# Patient Record
(demographics unavailable — no encounter records)

---

## 2017-06-20 NOTE — ERA
ER Documentation


Chief Complaint


Date/Time


DATE: 6/20/17 


TIME: 19:53


Chief Complaint


BIB  C/O LOWER BACK PAIN X 2 WEEKS.DENIES ANY TRAUMA





HPI


This is a 46-year-old female presenting with a chief complaint of chest pain 

for the past few months that worsens at night, and mid back pain 2 weeks.  

Patient has a history of hypertension and TIA 1 year ago.  Has a chronic 

history of shortness of breath.  Denies smoking, recreational drug use or 

alcohol.


Patient says that the mid back pain is worse with movement.  Patient's back 

pain has been for the past 2 months.  Patient denies pain lasting more than 6 

weeks; Denies saddle parasthesia, incontinence, RPND, or pain exacerbated by 

valsalva; Denies fever, chills, night sweats, weight loss, or increase symptoms 

at night. Patient also denies h/o cardiovascular dz, sciatica, disk herniation, 

spinal stenosis, fibromyalgia, cancer, HIV, IVDU, arthritis or recent surgery.  

Patient has no other complaints and describes no other associated 

manifestations.





ROS


All systems reviewed and are negative except as per history of present illness.





Medications


Home Meds


Active Scripts


Ibuprofen* (Motrin*) 600 Mg Tab, 600 MG PO Q6H Y for PAIN AND OR ELEVATED TEMP, 

#30 TAB


   Prov:BRADY BOYCE PA-C         6/20/17


Reported Medications


Metoprolol Succinate* (Toprol XL*) 100 Mg Tab.sr.24h, 100 MG PO DAILY, #30 TAB


   12/22/16





Allergies


Allergies:  


Coded Allergies:  


     No Known Allergy (Unverified , 12/22/16)





PMhx/Soc


History of Surgery:  Yes (tubal ligation.)


Anesthesia Reaction:  No


Hx Neurological Disorder:  No


Hx Respiratory Disorders:  No


Hx Cardiac Disorders:  No


Hx Psychiatric Problems:  No


Hx Miscellaneous Medical Probl:  Yes (HTN)


Hx Alcohol Use:  No


Hx Substance Use:  No


Hx Tobacco Use:  No


Smoking Status:  Never smoker





Physical Exam


Vitals





Vital Signs








  Date Time  Temp Pulse Resp B/P Pulse Ox O2 Delivery O2 Flow Rate FiO2


 


6/20/17 23:49 98.7 74 20 203/111 100 Room Air  


 


6/20/17 17:16 98.5 121 20 199/109 99   








Physical Exam


Const: Well-appearing well-developed 46-year-old female


Head:   Atraumatic 


Eyes:    Normal Conjunctiva


ENT:    Normal External Ears, Nose and Mouth.


Neck:               Full range of motion..~ No meningismus.


Resp:    Clear to auscultation bilaterally


Cardio:    Regular rate and rhythm, no murmurs.  Mild apical heave palpated.


Abd:    Soft, non tender, non distended. Normal bowel sounds


Skin:    No petechiae or rashes


Back:   Mild tenderness lateral to the spine bilaterally.  No midline 

tenderness.  No flank tenderness. 


Ext:    No cyanosis, or edema


Neur:    Awake and alert


Psych:    Normal Mood and Affect


Result Diagram:  


6/20/17 2234 6/20/17 2234





Results 24 hrs








 Laboratory Tests








Test


  6/20/17


22:34


 


White Blood Count 12.810^3/ul 


 


Red Blood Count 4.4910^6/ul 


 


Hemoglobin 10.6g/dl 


 


Hematocrit 34.3% 


 


Mean Corpuscular Volume 76.4fl 


 


Mean Corpuscular Hemoglobin 23.6pg 


 


Mean Corpuscular Hemoglobin


Concent 30.9g/dl 


 


 


Red Cell Distribution Width 16.6% 


 


Platelet Count 28585^3/UL 


 


Mean Platelet Volume 10.7fl 


 


Neutrophils % 55.0% 


 


Lymphocytes % 33.7% 


 


Monocytes % 7.7% 


 


Eosinophils % 3.1% 


 


Basophils % 0.2% 


 


Nucleated Red Blood Cells % 0.0/100WBC 


 


Neutrophils # 7.010^3/ul 


 


Lymphocytes # 4.310^3/ul 


 


Monocytes # 1.010^3/ul 


 


Eosinophils # 0.410^3/ul 


 


Basophils # 0.010^3/ul 


 


Nucleated Red Blood Cells # 0.010^3/ul 


 


D-Dimer 251.59ng/ml 


 


D-Dimer Comment  


 


Sodium Level 140mmol/L 


 


Potassium Level 3.7mmol/L 


 


Chloride Level 107mmol/L 


 


Carbon Dioxide Level 21mmol/L 


 


Anion Gap 16 


 


Blood Urea Nitrogen 10mg/dl 


 


Creatinine 0.69mg/dl 


 


Glucose Level 96mg/dl 


 


Calcium Level 9.7mg/dl 


 


Total Bilirubin 0.1mg/dl 


 


Direct Bilirubin 0.00mg/dl 


 


Indirect Bilirubin 0.1mg/dl 


 


Aspartate Amino Transf


(AST/SGOT) 23IU/L 


 


 


Alanine Aminotransferase


(ALT/SGPT) 25IU/L 


 


 


Alkaline Phosphatase 118IU/L 


 


Troponin I < 0.012ng/ml 


 


Total Protein 8.6g/dl 


 


Albumin 5.1g/dl 


 


Globulin 3.50g/dl 


 


Albumin/Globulin Ratio 1.45 














Procedures/MDM


The patient was evaluated and worked up for atypical chest pain as described in 

the history and physical exam. 


The patient had an EKG taken.  EKG was read by me as normal sinus rhythm with 

low voltage, good baseline normal access no ST changes.  My attending Dr. Mancia read the EKG as well as unremarkable.


Patient denied pain medications.  The workup after presenting my case to my 

attending Dr. Mancia included an x-ray of the thoracic spine, chest x-ray, d-

dimer, CMP, CBC and troponin.  Labs were relevant for 12.8 white blood cells 

and red blood cell levels consistent with microcytic hemolytic anemia.  X-rays 

was read by the radiologist as unremarkable.  The current most likely diagnosis 

is atypical chest pain and musculoskeletal, strain versus sprain of the 

paraspinal muscles of the back.  Treatment will thus include outpatient 

ibuprofen for symptomatic relief.  I do not believe that muscle relaxants or 

narcotics are appropriate at this time as there is no evidence of paraspinal 

spasm on x-ray and patient's pain is mild. At this time I do not suspect the 

chest pain to be due to an acute coronary syndrome, pericarditis, aortic 

dissection, pulmonary embolism, pneumothorax, esophageal tear/rupture, 

pneumonia or pancreatitis.  I have very little suspicion for cauda equina or 

other neurovascular compromise. I have spoke with the patient regarding their 

condition and future management. They have verbally responded that they 

understand their status and treatment plan. The patients vitals are stable, 

and their current condition is appropriate for discharge. The patient will be 

given discharge instructions with return precautions.





Departure


Diagnosis:  


 Primary Impression:  


 Back pain


 Qualified Code:  M54.6 - Chronic bilateral thoracic back pain


 Additional Impression:  


 Atypical chest pain


Condition:  Stable





Additional Instructions:  


Follow up with your PCP within the next 1-3 days for a more thorough evaluation 

and a possible referral to a specialist. Return the the emergency department 

immediately if symptoms worsen or change. If you have any questions regarding 

medications, ask your pharmacist or us before you leave. If any adverse 

reactions occur while taking your medications, discontinue the treatment and 

return to the emergency department immediately.  Take your medications as 

directed, and complete the entire course of treatment.











BRADY BOYCE PA-C Jun 20, 2017 19:56

## 2017-06-20 NOTE — RADRPT
PROCEDURE:   XR Chest. 

 

CLINICAL INDICATION:     Chest pain.   

 

TECHNIQUE:   Portable AP upright view of the chest was obtained. 

 

COMPARISON:   12/22/2016 

 

FINDINGS:

 

The cardiomediastinal silhouette is within normal limits.  The lungs are clear.  There is no evidenc
e for pleural effusion, pneumothorax or pulmonary vascular congestion.  The osseous structures are i
ntact with no evidence for acute abnormality.  

 

RPTAT:HJJR

 

IMPRESSION:

 

No evidence for acute intrathoracic pathology or interval change from 12/22/2016.

_____________________________________________ 

Physician Brittni           Date    Time 

Electronically viewed and signed by Km Robledo Physician on 06/20/2017 23:03 

 

D:  06/20/2017 23:03  T:  06/20/2017 23:03

JR/

## 2017-06-20 NOTE — RADRPT
PROCEDURE:   XR thoracic spine 

 

CLINICAL INDICATION:  Back pain.

 

TECHNIQUE:   AP and lateral views of the thoracic spine were obtained. 

 

COMPARISON:   None available

 

FINDINGS:

 

Bone architecture and mineralization are preserved. Thoracic kyphosis is preserved. Vertebral body s
tature is intact.  No significant disk space narrowing is present. No lytic or blastic lesion is imelda
dent.  The posterior elements and paraspinal soft tissues are normal.

 

RPTAT:HJJR

 

IMPRESSION:

 

Unremarkable thoracic spine series.

_____________________________________________ 

Physician Brittni           Date    Time 

Electronically viewed and signed by Physician Brittni on 06/20/2017 21:23 

 

D:  06/20/2017 21:23  T:  06/20/2017 21:23

JR/

## 2017-10-26 NOTE — ERD
ER Documentation


Chief Complaint


Chief Complaint


left sided numbness w/ htn and neck pain x 1 day





ROS


All systems reviewed and are negative except as per history of present illness.





Medications


Home Meds


Active Scripts


Naproxen* (Naprosyn*) 500 Mg Tablet, 500 MG PO BID Y for PAIN AND/OR 

INFLAMMATION, #30 TAB


   Prov:GREY EDWARD MD         10/26/17


Alprazolam* (Xanax*) 0.5 Mg Tab, 0.5 MG PO TID for MUSCLE SPASMS, #15 TAB


   Prov:GREY EDWARD MD         10/26/17


Ibuprofen* (Motrin*) 600 Mg Tab, 600 MG PO Q6H Y for PAIN AND OR ELEVATED TEMP, 

#30 TAB


   Prov:BRADY BOYCE PA-C         6/20/17


Reported Medications


Metoprolol Succinate* (Toprol XL*) 100 Mg Tab.sr.24h, 100 MG PO DAILY, #30 TAB


   12/22/16





Allergies


Allergies:  


Coded Allergies:  


     No Known Allergy (Unverified , 12/22/16)





PMhx/Soc


History of ischemic stroke without residual motor deficits, hypertension, 

anxiety


History of Surgery:  Yes (C- SECTION)


Anesthesia Reaction:  No


Hx Neurological Disorder:  Yes (HX. CVA WITHOUT DEFICITS (PER PT))


Hx Respiratory Disorders:  No


Hx Cardiac Disorders:  No


Hx Psychiatric Problems:  No


Hx Miscellaneous Medical Probl:  Yes (HTN)


Hx Alcohol Use:  No


Hx Substance Use:  No


Hx Tobacco Use:  No


Smoking Status:  Never smoker





FmHx


Family History:  No diabetes





Physical Exam


Vitals





Vital Signs








  Date Time  Temp Pulse Resp B/P Pulse Ox O2 Delivery O2 Flow Rate FiO2


 


10/26/17 20:24 98.5 84 17 168/131 100   


 


10/26/17 19:18 97.8 88 20 213/105 100   








Physical Exam


GENERAL: Well-developed, well-nourished, well-hydrated, in no apparent distress

, looks nontoxic in appearance


HEENT: Moist mucous membranes, pink conjunctiva, no cervical spine tenderness 

or step-off deformities, no goiter, no jaundice or icterus, extraocular 

movements intact without pain. No submandibular induration, and no pharyngeal 

erythema


NEURO: Alert and oriented 3, cranial nerves II through XII intact bilaterally, 

pupils equal round reactive to light, no focal deficits or facial asymmetry, 

sensation intact distally Strength 5/5 in upper and lower extremities 

bilaterally


CARDIAC: Regular rate and rhythm, no murmurs rubs or gallops


LUNGS: Clear bilaterally no wheezing crackles or stridor


ABDOMEN: Soft nontender, no guarding, no rigidity, no rebound, no psoas sign no 

obturator sign. Normoactive bowel sounds


SKIN: Warm and dry to touch, no abrasions, contusions, or hematomas, no 

lacerations, no ecchymosis, no target lesions, and without ulcers


EXTREMITIES: No clubbing cyanosis or edema, calves are bilaterally symmetrical, 

no Homans sign, no popliteal cord sign. Distal pulses equal and bilateral


PSYCH: Normal affect without agitation or irritability


Results 24 hrs





 Current Medications








 Medications


  (Trade)  Dose


 Ordered  Sig/Dave


 Route


 PRN Reason  Start Time


 Stop Time Status Last Admin


Dose Admin


 


 Alprazolam


  (Xanax)  0.5 mg  ONCE  ONCE


 PO


   10/26/17 20:30


 10/26/17 20:31 DC 10/26/17 20:40


 


 


 Ketorolac


 Tromethamine


  (Toradol)  30 mg  ONCE  STAT


 IM


   10/26/17 20:22


 10/26/17 20:23 DC 10/26/17 20:40


 











Procedures/MDM


I administered Toradol 30 mg IM 1 and alprazolam 0.5 mg p.o. for her symptoms.





Differential diagnoses considered, included but not limited to acute coronary 

syndrome, pulmonary embolism, aortic dissection, abdominal aortic aneurysm, 

sepsis, stroke, meningitis, encephalitis, pneumonia, appendicitis, cholecystitis

, bowel obstruction, pyelonephritis, nephrolithiasis, cystitis, as well as 

metabolic, hematologic, and electrolyte abnormalities. As well as abscess, 

cellulitis, fractures, and dislocations.


Patient feels much better at this time, and vital signs are normal, symptoms 

have improved. I did give strict instructions to return to the ED if symptoms 

continue or worsen, patient will otherwise follow-up with primary care 

physician. Patient understood instructions and agreed to plan.





Disclaimer: Inadvertent spelling and grammatical errors are likely due to EHR/

dictation software use and do not reflect on the overall quality of patient 

care. Also, please note that the electronic time recorded on this note does not 

necessarily reflect the actual time of the patient encounter.





Departure


Diagnosis:  


 Primary Impression:  


 Paresthesia


 Additional Impression:  


 Cervical radiculopathy


Condition:  Good


Patient Instructions:  Radiculopathy, Cervical, Paraesthesias











GREY EDWARD MD Oct 26, 2017 23:35